# Patient Record
Sex: MALE | Race: WHITE | NOT HISPANIC OR LATINO | URBAN - METROPOLITAN AREA
[De-identification: names, ages, dates, MRNs, and addresses within clinical notes are randomized per-mention and may not be internally consistent; named-entity substitution may affect disease eponyms.]

---

## 2019-10-10 ENCOUNTER — EMERGENCY (EMERGENCY)
Facility: HOSPITAL | Age: 34
LOS: 1 days | Discharge: HOME | End: 2019-10-10
Attending: EMERGENCY MEDICINE
Payer: COMMERCIAL

## 2019-10-10 VITALS
OXYGEN SATURATION: 100 % | DIASTOLIC BLOOD PRESSURE: 85 MMHG | RESPIRATION RATE: 19 BRPM | SYSTOLIC BLOOD PRESSURE: 125 MMHG | TEMPERATURE: 98 F | HEART RATE: 85 BPM

## 2019-10-10 VITALS
SYSTOLIC BLOOD PRESSURE: 130 MMHG | OXYGEN SATURATION: 97 % | DIASTOLIC BLOOD PRESSURE: 85 MMHG | HEIGHT: 73 IN | TEMPERATURE: 98 F | HEART RATE: 95 BPM | RESPIRATION RATE: 20 BRPM | WEIGHT: 304.9 LBS

## 2019-10-10 LAB
APPEARANCE UR: CLEAR — SIGNIFICANT CHANGE UP
BACTERIA # UR AUTO: NEGATIVE — SIGNIFICANT CHANGE UP
BILIRUB UR-MCNC: NEGATIVE — SIGNIFICANT CHANGE UP
COLOR SPEC: YELLOW — SIGNIFICANT CHANGE UP
DIFF PNL FLD: ABNORMAL
EPI CELLS # UR: 2 — SIGNIFICANT CHANGE UP
GLUCOSE UR QL: NEGATIVE — SIGNIFICANT CHANGE UP
GRAN CASTS # UR COMP ASSIST: 1 /LPF — SIGNIFICANT CHANGE UP
HYALINE CASTS # UR AUTO: 2 /LPF — SIGNIFICANT CHANGE UP (ref 0–7)
KETONES UR-MCNC: NEGATIVE — SIGNIFICANT CHANGE UP
LEUKOCYTE ESTERASE UR-ACNC: NEGATIVE — SIGNIFICANT CHANGE UP
NITRITE UR-MCNC: NEGATIVE — SIGNIFICANT CHANGE UP
PH UR: 6.5 — SIGNIFICANT CHANGE UP (ref 5–8)
PROT UR-MCNC: ABNORMAL
RBC CASTS # UR COMP ASSIST: 18 /HPF — HIGH (ref 0–4)
SP GR SPEC: 1.02 — SIGNIFICANT CHANGE UP (ref 1.01–1.02)
UROBILINOGEN FLD QL: NEGATIVE — SIGNIFICANT CHANGE UP
WBC UR QL: 3 /HPF — SIGNIFICANT CHANGE UP (ref 0–5)

## 2019-10-10 PROCEDURE — 76870 US EXAM SCROTUM: CPT

## 2019-10-10 PROCEDURE — 76870 US EXAM SCROTUM: CPT | Mod: 26

## 2019-10-10 PROCEDURE — 99284 EMERGENCY DEPT VISIT MOD MDM: CPT

## 2019-10-10 PROCEDURE — 87491 CHLMYD TRACH DNA AMP PROBE: CPT

## 2019-10-10 PROCEDURE — 93975 VASCULAR STUDY: CPT | Mod: 26

## 2019-10-10 PROCEDURE — 81001 URINALYSIS AUTO W/SCOPE: CPT

## 2019-10-10 PROCEDURE — 99284 EMERGENCY DEPT VISIT MOD MDM: CPT | Mod: 25

## 2019-10-10 PROCEDURE — 93975 VASCULAR STUDY: CPT

## 2019-10-10 PROCEDURE — 87591 N.GONORRHOEAE DNA AMP PROB: CPT

## 2019-10-10 NOTE — ED PROVIDER NOTE - NSFOLLOWUPINSTRUCTIONS_ED_ALL_ED_FT
You were diagnosed with a varicocele.    1) Follow up with your doctor next week. You were provided with information to follow up with the urologist  2) Return to the ED immediately for new or worsening symptoms  3) Please continue to take any home medications as prescribed  4) Your test results from your ED visit were discussed with you prior to discharge  5) You were provided with a copy of your test results  6) For any continued discomfort please take Motrin 600mg by mouth every 6 hours as needed for pain. Please take this medication with food. Or please take tylenol 650 mg every 4 hours as needed for pain. Please do not exceed more than 4,000mg of Tylenol in a day

## 2019-10-10 NOTE — ED PROVIDER NOTE - PATIENT PORTAL LINK FT
You can access the FollowMyHealth Patient Portal offered by [facility] by registering at the following website: [email or web address]. By joining [employer]’s FollowMyHealth portal, you will also be able to view your health information using other applications (apps) compatible with our system.

## 2019-10-10 NOTE — ED PROVIDER NOTE - ATTENDING CONTRIBUTION TO CARE
35 yo male w/ L testicle pain.  no UTI sx, no STD sx, intercourse with wife only.  no evidence of hernia on exam.  u/s, eval for torsion vs epididymitis, UA and reassess.

## 2019-10-10 NOTE — ED PROVIDER NOTE - NSFOLLOWUPCLINICS_GEN_ALL_ED_FT
[facility]  Urology  [address] [place] [address] - [address] [place] [address]  Phone: [phone]  Fax:   Follow Up Time:

## 2019-10-10 NOTE — ED PROVIDER NOTE - PROGRESS NOTE DETAILS
Dr. Chilango Valencia, PGY-2: UA w/o signs of infection. US w/ L sided varicocele. Informed pt of these findings. Will provide outpatient uro f/u information. Return precautions provided and pt verbalized understanding. Ready for DC.

## 2019-10-10 NOTE — ED PROVIDER NOTE - OBJECTIVE STATEMENT
35 y/o M w/ no sig PMH presenting w/ L testicular pain. Pt referred to ED from urgent care. Pt reports the pain started gradually approx 2 days ago while sitting on the couch. Pain has been constant, non radiating since. Described more as "discomfort" as opposed to pain. Has had this happen before but resolved after a few hours. Denies trauma to the testicles. Denies pain w/ urination, frequency, or urgency. Has not taken any medication at home. No hx of STI in the past. Monogamous relationship w/ wife. Denies fevers, chills, headache, dizziness, chest pain, cough, shortness of breath, abdominal pain, n/v/d/c, MSK pain.

## 2019-10-10 NOTE — ED PROVIDER NOTE - NS ED ROS FT
GENERAL: No fever or chills  EYES: No change in vision  HEENT: No trouble swallowing or speaking  CARDIAC: No chest pain  PULMONARY: No cough or SOB  GI: No abdominal pain, no nausea or no vomiting, no diarrhea or constipation  : per HPI  SKIN: No rashes  NEURO: No headache, no numbness  MSK: No joint pain  Otherwise as HPI or negative.

## 2019-10-10 NOTE — ED ADULT NURSE NOTE - OBJECTIVE STATEMENT
35 yo male presents in the ed for left testicular pain. pt is alert and oriented x4 speaking coherently. Pt states that yesterday pain started- pt states that pain is uncomfortable but not excruciating- pt states that he is sexually active in a monogamous relationship with his wife- no discharge from penis or painful urination noted. Pt has no swelling or redness on exam- pt denies chest pain, fever, abdominal pain, nausea, vomiting. md at bedside, will continue to reassess,.

## 2019-10-10 NOTE — ED PROVIDER NOTE - PHYSICAL EXAMINATION
Gen: NAD, AOx3, able to make needs known, non-toxic  Head: NCAT  HEENT: EOMI, oral mucosa moist, normal conjunctiva  Lung: CTAB, no respiratory distress, no wheezes/rhonchi/rales B/L, speaking in full sentences  CV: RRR, no murmurs  Abd: soft, NTND, no guarding, no CVA tenderness  MSK: no visible deformities  Neuro: No focal sensory or motor deficits  Skin: Warm, well perfused, no rash  Psych: normal affect      exam chaperoned by Dr. Hightower: R testicle non tender, non erythematous. L testicle: tender to palpation upper pole, non erythematous. No scrotal swelling. No hernias appreciated.

## 2019-10-11 LAB
C TRACH RRNA SPEC QL NAA+PROBE: SIGNIFICANT CHANGE UP
N GONORRHOEA RRNA SPEC QL NAA+PROBE: SIGNIFICANT CHANGE UP
SPECIMEN SOURCE: SIGNIFICANT CHANGE UP

## 2024-09-21 ENCOUNTER — NON-APPOINTMENT (OUTPATIENT)
Age: 39
End: 2024-09-21

## 2025-06-12 NOTE — ED ADULT NURSE NOTE - MODE OF DISCHARGE
Dangers of cigarette smoking were reviewed with patient in detail. Patient was Counseled for 3-10 minutes. Nicotine replacement options were discussed. Nicotine replacement was discussed- prescribed   Ambulatory